# Patient Record
Sex: FEMALE | Race: WHITE | URBAN - METROPOLITAN AREA
[De-identification: names, ages, dates, MRNs, and addresses within clinical notes are randomized per-mention and may not be internally consistent; named-entity substitution may affect disease eponyms.]

---

## 2019-10-09 ENCOUNTER — OFFICE VISIT (OUTPATIENT)
Dept: FAMILY MEDICINE CLINIC | Age: 29
End: 2019-10-09

## 2019-10-09 VITALS
BODY MASS INDEX: 19.6 KG/M2 | DIASTOLIC BLOOD PRESSURE: 66 MMHG | RESPIRATION RATE: 14 BRPM | OXYGEN SATURATION: 97 % | SYSTOLIC BLOOD PRESSURE: 110 MMHG | HEART RATE: 98 BPM | HEIGHT: 63 IN | WEIGHT: 110.6 LBS | TEMPERATURE: 97.8 F

## 2019-10-09 DIAGNOSIS — Z86.59 HISTORY OF PANIC ATTACKS: ICD-10-CM

## 2019-10-09 DIAGNOSIS — F42.2 MIXED OBSESSIONAL THOUGHTS AND ACTS: ICD-10-CM

## 2019-10-09 DIAGNOSIS — F41.9 ANXIETY: Primary | ICD-10-CM

## 2019-10-09 DIAGNOSIS — Z23 ENCOUNTER FOR IMMUNIZATION: ICD-10-CM

## 2019-10-09 RX ORDER — SERTRALINE HYDROCHLORIDE 100 MG/1
100 TABLET, FILM COATED ORAL DAILY
Qty: 90 TAB | Refills: 1 | Status: SHIPPED | OUTPATIENT
Start: 2019-10-09 | End: 2020-02-11 | Stop reason: SDUPTHER

## 2019-10-09 RX ORDER — SERTRALINE HYDROCHLORIDE 100 MG/1
TABLET, FILM COATED ORAL DAILY
COMMUNITY
End: 2019-10-09 | Stop reason: SDUPTHER

## 2019-10-09 NOTE — PROGRESS NOTES
Harmony Bray is a 30 y.o. female (: 1990) presenting to address:    Chief Complaint   Patient presents with    Medication Evaluation     request flu vaccine       Vitals:    10/09/19 1511   BP: 110/66   Pulse: 98   Resp: 14   Temp: 97.8 °F (36.6 °C)   TempSrc: Oral   SpO2: 97%   Weight: 110 lb 9.6 oz (50.2 kg)   Height: 5' 2.6\" (1.59 m)   PainSc:   0 - No pain       Hearing/Vision:   No exam data present    Learning Assessment:     Learning Assessment 10/9/2019   PRIMARY LEARNER Patient   HIGHEST LEVEL OF EDUCATION - PRIMARY LEARNER  4 YEARS OF COLLEGE   BARRIERS PRIMARY LEARNER NONE   CO-LEARNER CAREGIVER No   PRIMARY LANGUAGE ENGLISH    NEED No   LEARNER PREFERENCE PRIMARY LISTENING   ANSWERED BY patient   RELATIONSHIP SELF     Depression Screening:     3 most recent PHQ Screens 10/9/2019   Little interest or pleasure in doing things Not at all   Feeling down, depressed, irritable, or hopeless Not at all   Total Score PHQ 2 0     Fall Risk Assessment:     Fall Risk Assessment, last 12 mths 10/9/2019   Able to walk? Yes   Fall in past 12 months? No     Abuse Screening:     Abuse Screening Questionnaire 10/9/2019   Do you ever feel afraid of your partner? N   Are you in a relationship with someone who physically or mentally threatens you? N   Is it safe for you to go home? Y     Coordination of Care Questionaire:   1. Have you been to the ER, urgent care clinic since your last visit? Hospitalized since your last visit? YES, for childbirth    2. Have you seen or consulted any other health care providers outside of the [address] [place] [address] since your last visit? Include any pap smears or colon screening. YES, OB/GYN    Advanced Directive:   1. Do you have an Advanced Directive? NO    2. Would you like information on Advanced Directives?  NO

## 2019-10-09 NOTE — PROGRESS NOTES
Flu Immunization/s administered 10/9/2019 by Omid Pierre with consent. Patient tolerated procedure well. No reactions noted.

## 2019-10-09 NOTE — PATIENT INSTRUCTIONS
Obsessive-Compulsive Disorder: Care Instructions  Your Care Instructions    Obsessive-compulsive disorder (OCD) makes you have unwanted thoughts that occur over and over. For example, you might always wonder if the oven was turned off before you left home. To get rid of these thoughts, you may also develop a compulsion. This is an action or ritual you perform again and again. You might check several times to make sure the oven is off. The action only makes you feel better for a short time. If you try to resist the urge to do it, you may feel great anxiety or have panic attacks. Counseling (also called therapy) can help you control your thoughts and actions. You may have one-on-one therapy or group therapy, or both. In group therapy, people with the same concerns share their feelings and give each other support. You also may have family therapy. Your loved ones can learn more about how to help you. Your doctor also may prescribe medicine, such as an antidepressant, to help with symptoms. Follow-up care is a key part of your treatment and safety. Be sure to make and go to all appointments, and call your doctor if you are having problems. It's also a good idea to know your test results and keep a list of the medicines you take. How can you care for yourself at home? · Take your medicines exactly as prescribed. Call your doctor if you think you are having a problem with your medicine. You will get more details on the specific medicines your doctor prescribes. · Go to your counseling sessions and follow-up appointments. · Do any homework or exercises that your therapist gives you to do at home. · Involve family members and loved ones in your treatment, especially if your doctor suggests you go to therapy together. · Try to reduce stress. This can help you cope. Some ways to do this include:  ? Taking slow, deep breaths. ? Soaking in a warm bath. ? Listening to soothing music. ?  Taking a walk or doing some other exercise. ? Taking a yoga class. ? Having a massage or back rub. ? Drinking a warm, nonalcoholic, noncaffeinated beverage. · Eating a healthy, balanced diet and avoiding certain foods or drinks may also help you reduce stress. ? Avoid or limit caffeine. Coffee, tea, some soda pops, and chocolate contain caffeine. If you drink a lot of caffeine, reduce the amount gradually. Suddenly stopping use of caffeine can cause headaches and make it hard for you to concentrate. ? Limit alcohol to 2 drinks a day for men and 1 drink a day for women. Too much alcohol can cause health problems. ? Make mealtimes calm and relaxed. Try not to skip meals or eat on the run. Skipping meals can make other stress-related symptoms worse, such as headaches or stomach tension. ? Avoid eating to relieve stress. Some people turn to food to comfort themselves when they are under stress. This can lead to overeating and guilt. If this is a problem for you, try to replace eating with other actions that relieve stress, like taking a walk, playing with a pet, or taking a bath. When should you call for help? Call 911 anytime you think you may need emergency care. For example, call if:    · You feel you cannot stop from hurting yourself or someone else.   [facility] your doctor now or seek immediate medical care if:    · A person with OCD mentions suicide. If a suicide threat seems real, with a specific plan and a way to carry it out, you should stay with the person, or ask someone you trust to stay with the person, until you get help.    Watch closely for changes in your health, and be sure to contact your doctor if:    · Your unwanted thoughts or repeated actions and rituals upset your daily activities.     · Your symptoms of OCD are new or different from those you had before. Where can you learn more? Go to http://estella-mirza.info/.   Enter M712 in the search box to learn more about \"Obsessive-Compulsive Disorder: Care Instructions. \"  Current as of: May 28, 2019  Content Version: 12.2  © 4309-5953 [employer]. Care instructions adapted under license by [employer] (which disclaims liability or warranty for this information). If you have questions about a medical condition or this instruction, always ask your healthcare professional. [place] [address] any warranty or liability for your use of this information.

## 2019-10-09 NOTE — PROGRESS NOTES
Jason Wen is a 30 y.o.  female and presents with    Chief Complaint   Patient presents with    Medication Evaluation     request flu vaccine         Subjective:  Patient presents to establish care. Patient new to the practice with concern for needing med refills for chronic diagnosis of anxiety, panic attacks, OCD. Patient states she was originally diagnosed at age 4 and has continued medication management throughout her life. Patient is stable currently no SEs with medication use would like to continue same medication. Current Outpatient Medications   Medication Sig Dispense Refill    sertraline (ZOLOFT) 100 mg tablet Take 1 Tab by mouth daily. 90 Tab 1     No Known Allergies  Past Medical History:   Diagnosis Date    Contact dermatitis and eczema due to cause     ezcema     No past surgical history on file. History reviewed. No pertinent family history. Social History     Tobacco Use    Smoking status: Never Smoker    Smokeless tobacco: Never Used   Substance Use Topics    Alcohol use: Yes     Alcohol/week: 2.0 standard drinks     Types: 1 Glasses of wine, 1 Cans of beer per week     Frequency: 2-4 times a month     Drinks per session: 1 or 2     Binge frequency: Never       Review of Systems   Constitutional: Negative for chills and fever. HENT: Negative. Eyes: Negative for blurred vision. Respiratory: Negative for shortness of breath. Cardiovascular: Negative for chest pain and leg swelling. Gastrointestinal: Negative for abdominal pain, constipation, diarrhea, nausea and vomiting. Genitourinary: Negative. Musculoskeletal: Negative. Skin: Negative. Neurological: Negative for headaches. Psychiatric/Behavioral: The patient is nervous/anxious.          + dx ocd         Objective:  Vitals:    10/09/19 1511   BP: 110/66   Pulse: 98   Resp: 14   Temp: 97.8 °F (36.6 °C)   TempSrc: Oral   SpO2: 97%   Weight: 110 lb 9.6 oz (50.2 kg)   Height: 5' 2.6\" (1.59 m)   PainSc:   0 - No pain     General:   Well-groomed, well-nourished, in no distress, pleasant, alert, appropriate    Neck:      Neck supple, no swelling, mass or tenderness or thyromegaly  Cardiovasc:   RRR,  no murmur, rubs or gallops. Pulmonary:    Lungs clear bilaterally, no wheezing, rales or rhonchi. Assessment/Plan:      1. Encounter for immunization  - INFLUENZA VIRUS VAC QUAD,SPLIT,PRESV FREE SYRINGE IM    2. Anxiety  3. Mixed obsessional thoughts and acts  4. History of panic attacks  Zoloft refilled same dose stable condition no change continue for now return to care in 6 months and as needed. Discussed patient will be due for CPE and labs in February can align appointments to complete at six-month appointment. I have discussed the diagnosis with the patient and the intended plan as seen in the above orders. The patient has received an after-visit summary and questions were answered concerning future plans. I have discussed medication side effects and warnings with the patient as well. I have reviewed the plan of care with the patient, accepted their input and they are in agreement with the treatment goals. Follow-up and Dispositions    · Return in about 6 months (around 4/9/2020). More than 1/2 of this 30 minute visit was spent face to face in counselling and coordination of care, as described above. This document may have been created with the aid of dictation software. Text may contain errors, particularly phonetic errors.      Kathy Valdez [facility]

## 2020-01-29 ENCOUNTER — TELEPHONE (OUTPATIENT)
Dept: FAMILY MEDICINE CLINIC | Age: 30
End: 2020-01-29

## 2020-01-29 DIAGNOSIS — Z00.00 ROUTINE ADULT HEALTH MAINTENANCE: Primary | ICD-10-CM

## 2020-01-29 DIAGNOSIS — F41.9 ANXIETY: ICD-10-CM

## 2020-01-29 NOTE — TELEPHONE ENCOUNTER
Please call Ashia Lucas and obtain further information re: her pregnancy status. When was her LMP (or does she have a due date)? Has she established care with OB/GYN? Will put in orders appropriately once that info is reviewed.

## 2020-01-29 NOTE — TELEPHONE ENCOUNTER
Patient came in office for scheduled CPE with NP Skye Jung, patient was unaware that NP was no longer in office and has scheduled the next available appt w/ Dr. Laura Mccormick for CPE and patient was informed to call week before to make sure labs were placed. Patient informed me that she is pregnant so that if there were any additional labs that needed to be added that could be done. Please advise.   Future Appointments   Date Time Provider Addison Connor   2/11/2020  8:30 AM Mary Leblanc MD [facility]

## 2020-01-30 DIAGNOSIS — F41.9 ANXIETY: ICD-10-CM

## 2020-01-30 DIAGNOSIS — Z00.00 ROUTINE ADULT HEALTH MAINTENANCE: ICD-10-CM

## 2020-01-30 NOTE — TELEPHONE ENCOUNTER
Labs have been ordered. No need to be fasting, as checking cholesterol while pregnant would make them falsely elevated.     Orders Placed This Encounter    HEMOGLOBIN A1C WITH EAG     Standing Status:   Future     Standing Expiration Date:   1/30/2021    CBC WITH AUTOMATED DIFF     Standing Status:   Future     Standing Expiration Date:   2/80/5284    METABOLIC PANEL, COMPREHENSIVE     Standing Status:   Future     Standing Expiration Date:   1/30/2021    T4, FREE     Standing Status:   Future     Standing Expiration Date:   1/30/2021    TSH 3RD GENERATION     Standing Status:   Future     Standing Expiration Date:   1/30/2021    VITAMIN D, 25 HYDROXY     Standing Status:   Future     Standing Expiration Date:   1/30/2021     Future Appointments   Date Time Provider Addison Miristi   2/11/2020  8:30 AM Ronel Valdes MD [facility]

## 2020-01-30 NOTE — TELEPHONE ENCOUNTER
Spoke to patient she states the first day of her last cycle was 12/22/20. She does have an OBGYN Dr. Lorri Gonzalez. Informed her to come have labs done at least a week prior to appointment and to fast incase fasting labs are needed. Will update gyn in care team as well.

## 2020-02-11 ENCOUNTER — OFFICE VISIT (OUTPATIENT)
Dept: FAMILY MEDICINE CLINIC | Age: 30
End: 2020-02-11

## 2020-02-11 VITALS
OXYGEN SATURATION: 99 % | TEMPERATURE: 98.3 F | BODY MASS INDEX: 20.06 KG/M2 | HEART RATE: 87 BPM | HEIGHT: 63 IN | SYSTOLIC BLOOD PRESSURE: 101 MMHG | WEIGHT: 113.2 LBS | RESPIRATION RATE: 16 BRPM | DIASTOLIC BLOOD PRESSURE: 55 MMHG

## 2020-02-11 DIAGNOSIS — F41.9 ANXIETY: ICD-10-CM

## 2020-02-11 DIAGNOSIS — Z00.00 ROUTINE ADULT HEALTH MAINTENANCE: Primary | ICD-10-CM

## 2020-02-11 DIAGNOSIS — N64.4 BREAST PAIN, LEFT: ICD-10-CM

## 2020-02-11 DIAGNOSIS — Z3A.01 LESS THAN 8 WEEKS GESTATION OF PREGNANCY: ICD-10-CM

## 2020-02-11 LAB
25(OH)D3+25(OH)D2 SERPL-MCNC: 20.2 NG/ML (ref 30–100)
ALBUMIN SERPL-MCNC: 4.6 G/DL (ref 3.8–4.8)
ALBUMIN/GLOB SERPL: 1.9 {RATIO} (ref 1.2–2.2)
ALP SERPL-CCNC: 67 IU/L (ref 39–117)
ALT SERPL-CCNC: 12 IU/L (ref 0–32)
AST SERPL-CCNC: 13 IU/L (ref 0–40)
BASOPHILS # BLD AUTO: 0 X10E3/UL (ref 0–0.2)
BASOPHILS NFR BLD AUTO: 0 %
BILIRUB SERPL-MCNC: 0.2 MG/DL (ref 0–1.2)
BUN SERPL-MCNC: 7 MG/DL (ref 6–20)
BUN/CREAT SERPL: 11 (ref 9–23)
CALCIUM SERPL-MCNC: 9.5 MG/DL (ref 8.7–10.2)
CHLORIDE SERPL-SCNC: 100 MMOL/L (ref 96–106)
CO2 SERPL-SCNC: 23 MMOL/L (ref 20–29)
CREAT SERPL-MCNC: 0.65 MG/DL (ref 0.57–1)
EOSINOPHIL # BLD AUTO: 0 X10E3/UL (ref 0–0.4)
EOSINOPHIL NFR BLD AUTO: 0 %
ERYTHROCYTE [DISTWIDTH] IN BLOOD BY AUTOMATED COUNT: 11.7 % (ref 11.7–15.4)
EST. AVERAGE GLUCOSE BLD GHB EST-MCNC: 100 MG/DL
GLOBULIN SER CALC-MCNC: 2.4 G/DL (ref 1.5–4.5)
GLUCOSE SERPL-MCNC: 86 MG/DL (ref 65–99)
HBA1C MFR BLD: 5.1 % (ref 4.8–5.6)
HCT VFR BLD AUTO: 37.7 % (ref 34–46.6)
HGB BLD-MCNC: 12.7 G/DL (ref 11.1–15.9)
IMM GRANULOCYTES # BLD AUTO: 0 X10E3/UL (ref 0–0.1)
IMM GRANULOCYTES NFR BLD AUTO: 0 %
LYMPHOCYTES # BLD AUTO: 2.5 X10E3/UL (ref 0.7–3.1)
LYMPHOCYTES NFR BLD AUTO: 29 %
MCH RBC QN AUTO: 31 PG (ref 26.6–33)
MCHC RBC AUTO-ENTMCNC: 33.7 G/DL (ref 31.5–35.7)
MCV RBC AUTO: 92 FL (ref 79–97)
MONOCYTES # BLD AUTO: 0.7 X10E3/UL (ref 0.1–0.9)
MONOCYTES NFR BLD AUTO: 8 %
NEUTROPHILS # BLD AUTO: 5.3 X10E3/UL (ref 1.4–7)
NEUTROPHILS NFR BLD AUTO: 63 %
PLATELET # BLD AUTO: 380 X10E3/UL (ref 150–450)
POTASSIUM SERPL-SCNC: 4.3 MMOL/L (ref 3.5–5.2)
PROT SERPL-MCNC: 7 G/DL (ref 6–8.5)
RBC # BLD AUTO: 4.1 X10E6/UL (ref 3.77–5.28)
SODIUM SERPL-SCNC: 138 MMOL/L (ref 134–144)
T4 FREE SERPL-MCNC: 1.06 NG/DL (ref 0.82–1.77)
TSH SERPL DL<=0.005 MIU/L-ACNC: 1.25 UIU/ML (ref 0.45–4.5)
WBC # BLD AUTO: 8.5 X10E3/UL (ref 3.4–10.8)

## 2020-02-11 RX ORDER — SERTRALINE HYDROCHLORIDE 100 MG/1
100 TABLET, FILM COATED ORAL DAILY
Qty: 90 TAB | Refills: 3 | Status: SHIPPED | OUTPATIENT
Start: 2020-02-11 | End: 2021-02-25

## 2020-02-11 NOTE — PROGRESS NOTES
Yanet Luke is a 30 y.o. female (: 1990) presenting to address:    Chief Complaint   Patient presents with    Complete Physical       Vitals:    20 0846   BP: 101/55   Pulse: 87   Resp: 16   Temp: 98.3 °F (36.8 °C)   TempSrc: Oral   SpO2: 99%   Weight: 113 lb 3.2 oz (51.3 kg)   Height: 5' 2.6\" (1.59 m)   PainSc:   0 - No pain   LMP: 2019       Hearing/Vision:   No exam data present    Learning Assessment:     Learning Assessment 10/9/2019   PRIMARY LEARNER Patient   HIGHEST LEVEL OF EDUCATION - PRIMARY LEARNER  4 YEARS OF COLLEGE   BARRIERS PRIMARY LEARNER NONE   CO-LEARNER CAREGIVER No   PRIMARY LANGUAGE ENGLISH    NEED No   LEARNER PREFERENCE PRIMARY LISTENING   ANSWERED BY patient   RELATIONSHIP SELF     Depression Screening:     3 most recent PHQ Screens 2020   Little interest or pleasure in doing things Not at all   Feeling down, depressed, irritable, or hopeless Not at all   Total Score PHQ 2 0   Trouble falling or staying asleep, or sleeping too much Not at all   Feeling tired or having little energy More than half the days   Poor appetite, weight loss, or overeating Not at all   Feeling bad about yourself - or that you are a failure or have let yourself or your family down Not at all   Trouble concentrating on things such as school, work, reading, or watching TV Not at all   Moving or speaking so slowly that other people could have noticed; or the opposite being so fidgety that others notice Not at all   Thoughts of being better off dead, or hurting yourself in some way Not at all   PHQ 9 Score 2   How difficult have these problems made it for you to do your work, take care of your home and get along with others Somewhat difficult     Fall Risk Assessment:     Fall Risk Assessment, last 12 mths 10/9/2019   Able to walk? Yes   Fall in past 12 months? No     Abuse Screening:     Abuse Screening Questionnaire 10/9/2019   Do you ever feel afraid of your partner?  N   Are you in a relationship with someone who physically or mentally threatens you? N   Is it safe for you to go home? Y     Coordination of Care Questionaire:   1. Have you been to the ER, urgent care clinic since your last visit? Hospitalized since your last visit? NO    2. Have you seen or consulted any other health care providers outside of the [address] [place] [address] since your last visit? Include any pap smears or colon screening. NO    Advanced Directive:   1. Do you have an Advanced Directive? NO    2. Would you like information on Advanced Directives?  NO

## 2020-02-11 NOTE — PROGRESS NOTES
[address] [facility]  Primary Care Office Visit - Complete Physical    Andres Cisse is a 30 y.o. female presenting for annual physical.  : 1990        Assessment/Plan:     1. Routine adult health maintenance  Establishing care with OB next week. See Dx #4. Labs pending (drawn yesterday). 2. Anxiety  Ongoing, well controlled. No change to current regimen. Is aware that she can continue this medication while pregnant and also while breastfeeding.  - sertraline (ZOLOFT) 100 mg tablet; Take 1 Tab by mouth daily. Dispense: 90 Tab; Refill: 3    3. Breast pain, left  Initial encounter. Relatively reassuring exam.  Advised her to inform her OB. 4. Less than 8 weeks gestation of pregnancy  Establishing care with OB next week. Is taking PNV. Orders & Diagnoses associated with This Encounter:         ICD-10-CM ICD-9-CM   1. Routine adult health maintenance Z00.00 V70.0   2. Anxiety F41.9 300.00   3. Breast pain, left N64.4 611.71   4. Less than 8 weeks gestation of pregnancy Z3A.01 V22.2       Orders Placed This Encounter    sertraline (ZOLOFT) 100 mg tablet     Sig: Take 1 Tab by mouth daily. Dispense:  90 Tab     Refill:  3       Management plan & patient instructions reviewed with Andres Tanna, who voiced understanding. This document may have been created with the aid of dictation software. Text may contain errors, particularly phonetic errors. Jeannie Reinoso MD  Internal Medicine, Family Medicine & Sports Medicine  2020         Subjective   History:   Andres Cisse is a 30 y.o. female presenting for an annual physical.    Had labs done yesterday. 11mo daughter born only 2 days after arriving to the area. LMP 2019; thus 7+1 GA today  1st appt with OB is Monday  + nausea, and more than usual fatigue    Also notes some dull left breast pain. Denies any nipple discharge, skin color changes. Stopped breastfeeding when daughter was ~5mo.   Denies any specific trauma however \"I do carry my daughter all the time on that side\". Has dx of OCD  Been on pharmacotherapy for a long time, specifically on zoloft since age 11  Did try to wean down / off during last pregnancy \"and that  Really didn't go well\"       AMIRA 2/7 2/11/2020   Feeling nervous, anxious or on edge? 0   Not being able to stop or control worrying? 0   AMIRA-2 Subtotal 0   Worrying too much about different things? 0   Trouble relaxing? 0   Being so restless that it is hard to sit still? 0   Becoming easily annoyed or irritable? 0   Feeling afraid as if something awful might happen? 0   AMIRA-7 Total Score 0   If you checked off any problems, how difficult have these problems made it for you to do your work, take care of thinks at home, or get along with other people? Not at all difficult       3 most recent PHQ Screens 2/11/2020   Little interest or pleasure in doing things Not at all   Feeling down, depressed, irritable, or hopeless Not at all   Total Score PHQ 2 0   Trouble falling or staying asleep, or sleeping too much Not at all   Feeling tired or having little energy More than half the days   Poor appetite, weight loss, or overeating Not at all   Feeling bad about yourself - or that you are a failure or have let yourself or your family down Not at all   Trouble concentrating on things such as school, work, reading, or watching TV Not at all   Moving or speaking so slowly that other people could have noticed; or the opposite being so fidgety that others notice Not at all   Thoughts of being better off dead, or hurting yourself in some way Not at all   PHQ 9 Score 2   How difficult have these problems made it for you to do your work, take care of your home and get along with others Somewhat difficult           Past Medical History:   Diagnosis Date    Contact dermatitis and eczema due to cause     ezcema     History reviewed. No pertinent surgical history. reports that she has never smoked.  She has never used smokeless tobacco. She reports previous alcohol use of about 2.0 standard drinks of alcohol per week. She reports that she does not use drugs. Social History     Patient does not qualify to have social determinant information on file (likely too young). Social History Narrative    2/11/2020:   to Sera Eldridge (in [place]), originally from [place]. Social History     Tobacco Use   Smoking Status Never Smoker   Smokeless Tobacco Never Used     Family History   Problem Relation Age of Onset    No Known Problems Mother     Other Father 46        ALS    No Known Problems Brother     No Known Problems Daughter      No Known Allergies    Problem List:      Patient Active Problem List    Diagnosis    Mixed obsessional thoughts and acts    History of panic attacks    Anxiety       Medications:     Current Outpatient Medications   Medication Sig    sertraline (ZOLOFT) 100 mg tablet Take 1 Tab by mouth daily. No current facility-administered medications for this visit. Review of Systems:     Review of Systems   Constitutional: Negative for chills and fever. HENT: Negative for ear pain and sore throat. Respiratory: Negative for cough and shortness of breath. Cardiovascular: Negative for chest pain and palpitations. Gastrointestinal: Negative for abdominal pain. Genitourinary: Negative for dysuria. Musculoskeletal: Negative for myalgias. Skin: Negative for rash. Neurological: Negative for speech change, focal weakness and headaches. Endo/Heme/Allergies: Does not bruise/bleed easily. Psychiatric/Behavioral: Negative for depression. The patient is not nervous/anxious and does not have insomnia.              Objective   Physical Assessment:   VS:    Visit Vitals  /55 (BP 1 Location: Right arm, BP Patient Position: Sitting)   Pulse 87   Temp 98.3 °F (36.8 °C) (Oral)   Resp 16   Ht 5' 2.6\" (1.59 m)   Wt 113 lb 3.2 oz (51.3 kg)   LMP 12/23/2019 (Exact Date)   SpO2 99% Breastfeeding Unknown   BMI 20.31 kg/m²       Physical Exam  Nursing note reviewed. Constitutional:       Appearance: She is well-developed. She is not diaphoretic. HENT:      Head: Normocephalic and atraumatic. Neck:      Musculoskeletal: Neck supple. Thyroid: No thyromegaly. Cardiovascular:      Rate and Rhythm: Normal rate and regular rhythm. Heart sounds: No murmur. Pulmonary:      Effort: Pulmonary effort is normal.      Breath sounds: Normal breath sounds. No wheezing or rales. Chest:       Musculoskeletal: Normal range of motion. Skin:     General: Skin is warm and dry. Neurological:      Mental Status: She is alert and oriented to person, place, and time. Cranial Nerves: No cranial nerve deficit. Coordination: Coordination normal.   Psychiatric:         Behavior: Behavior normal.         Thought Content:  Thought content normal.         Judgment: Judgment normal.

## 2020-02-11 NOTE — PATIENT INSTRUCTIONS
To Do: - finish signing up for [employer]  - let your OB know about your left breast discomfort, although the exam was benign to me  - ask them to send me a copy of their notes. - best of luck! TESTING RESULTS   Results will be released to [facility]. Normal results will be sent via mail. If you have questions about your results, please schedule a follow up appointment to discuss with your PCP. MEDICATION REFILLS    Please allow at least 2 business days for refill requests to be addressed. Medication refills may be requested through your pharmacy. Refills will not be provided by the after hours/on call provider.

## 2020-06-07 PROBLEM — E55.9 VITAMIN D DEFICIENCY: Status: ACTIVE | Noted: 2020-06-07

## 2021-02-15 ENCOUNTER — TELEPHONE (OUTPATIENT)
Dept: FAMILY MEDICINE CLINIC | Age: 31
End: 2021-02-15

## 2021-02-15 NOTE — TELEPHONE ENCOUNTER
Spoke to patient and informed that she just needs to call and get scheduled at a testing site and they will do the ordering.

## 2021-02-15 NOTE — TELEPHONE ENCOUNTER
Pt is currently experiencing symptoms and is wanting to be tested for covid however she says that since she has  they are requiring that we order it before she gets it done. She has a low grade fever of 99.4 and body aches. Please advise.

## 2021-09-22 DIAGNOSIS — F41.9 ANXIETY: ICD-10-CM

## 2021-09-23 RX ORDER — SERTRALINE HYDROCHLORIDE 100 MG/1
TABLET, FILM COATED ORAL
Qty: 90 TABLET | Refills: 1 | OUTPATIENT
Start: 2021-09-23

## 2022-03-19 PROBLEM — F42.2 MIXED OBSESSIONAL THOUGHTS AND ACTS: Status: ACTIVE | Noted: 2019-10-09

## 2022-03-19 PROBLEM — E55.9 VITAMIN D DEFICIENCY: Status: ACTIVE | Noted: 2020-06-07

## 2022-03-19 PROBLEM — F41.9 ANXIETY: Status: ACTIVE | Noted: 2019-03-18

## 2022-03-20 PROBLEM — Z86.59 HISTORY OF PANIC ATTACKS: Status: ACTIVE | Noted: 2019-10-09

## 2022-11-23 ENCOUNTER — TELEPHONE (OUTPATIENT)
Dept: FAMILY MEDICINE CLINIC | Age: 32
End: 2022-11-23

## 2022-11-23 NOTE — TELEPHONE ENCOUNTER
Lvm for pt to call to schedule transitional care/physical appt with provider of choice in the office.

## 2023-01-06 ENCOUNTER — NURSE TRIAGE (OUTPATIENT)
Dept: OTHER | Facility: CLINIC | Age: 33
End: 2023-01-06

## 2023-01-06 NOTE — TELEPHONE ENCOUNTER
Location of patient: [address] [place] from [facility] with [employer]; Patient with Red Flag Complaint requesting to establish care with [facility]. Subjective: Caller states \"tingling in hands, muscle pain in both arms and legs\"     Current Symptoms: see above, missed 2 cycles of period, pregnancy tests negative, tingling is intermittent, muscle pain in upper arms and thighs, fairly constant. Mild pain. Onset: 2 weeks ago; unchanged    Associated Symptoms: NA    Pain Severity: 3/10; dull and pressure and tightness; constant, mild    Temperature: denies fever     What has been tried: nothing    LMP:  end of october  Pregnant: No    Recommended disposition: See in Office Within 2 Kevengeorgina Byrne advised C if she cannot get new patient appt within 2 weeks, writer transferred to [facility] ([facility]) for new patient appt scheduling    Care advice provided, patient verbalizes understanding; denies any other questions or concerns; instructed to call back for any new or worsening symptoms. Patient/Caller agrees with recommended disposition; writer provided warm transfer to [facility] for appointment scheduling    Attention Provider: Thank you for allowing me to participate in the care of your patient. The patient was connected to triage in response to information provided to the [facility]. Please do not respond through this encounter as the response is not directed to a shared pool.     Reason for Disposition   MILD pain (e.g., does not interfere with normal activities) and present > 7 days    Protocols used: Muscle Aches and Body Pain-ADULT-[place]

## 2023-01-25 ENCOUNTER — OFFICE VISIT (OUTPATIENT)
Dept: FAMILY MEDICINE CLINIC | Age: 33
End: 2023-01-25
Payer: OTHER GOVERNMENT

## 2023-01-25 VITALS
BODY MASS INDEX: 19.95 KG/M2 | HEART RATE: 88 BPM | WEIGHT: 112.6 LBS | HEIGHT: 63 IN | DIASTOLIC BLOOD PRESSURE: 72 MMHG | RESPIRATION RATE: 18 BRPM | SYSTOLIC BLOOD PRESSURE: 112 MMHG | TEMPERATURE: 97.7 F | OXYGEN SATURATION: 97 %

## 2023-01-25 DIAGNOSIS — Z00.00 ROUTINE GENERAL MEDICAL EXAMINATION AT A HEALTH CARE FACILITY: Primary | ICD-10-CM

## 2023-01-25 DIAGNOSIS — F42.2 MIXED OBSESSIONAL THOUGHTS AND ACTS: ICD-10-CM

## 2023-01-25 DIAGNOSIS — E55.9 VITAMIN D DEFICIENCY: ICD-10-CM

## 2023-01-25 PROBLEM — F41.9 ANXIETY: Status: RESOLVED | Noted: 2019-03-18 | Resolved: 2023-01-25

## 2023-01-25 PROBLEM — Z86.59 HISTORY OF PANIC ATTACKS: Status: RESOLVED | Noted: 2019-10-09 | Resolved: 2023-01-25

## 2023-01-25 PROCEDURE — 99395 PREV VISIT EST AGE 18-39: CPT | Performed by: INTERNAL MEDICINE

## 2023-01-25 RX ORDER — NORETHINDRONE ACETATE AND ETHINYL ESTRADIOL AND FERROUS FUMARATE 1MG-20(21)
1 KIT ORAL DAILY
COMMUNITY
Start: 2022-11-13

## 2023-01-25 RX ORDER — SERTRALINE HYDROCHLORIDE 100 MG/1
100 TABLET, FILM COATED ORAL DAILY
Qty: 90 TABLET | Refills: 1 | Status: SHIPPED | OUTPATIENT
Start: 2023-01-25

## 2023-01-25 RX ORDER — MULTIVITAMIN
1 TABLET ORAL DAILY
COMMUNITY

## 2023-01-25 NOTE — PROGRESS NOTES
HISTORY OF PRESENT ILLNESS  Soo Mcintyre is a 27 y.o. female. HPI  In for CPE  OCD, stable, on zoloft daily, need refill  Vit d def, stable,she is taking MVI tablet daily, will check labs  Had pap/gynecology exam 2 weeks ago, stated that she also had CBC and TSH then, will get copy from Gynecology  Review of Systems   Constitutional:  Negative for fever. HENT:  Negative for sore throat. Respiratory:  Negative for cough and shortness of breath. Cardiovascular:  Negative for chest pain and leg swelling. Gastrointestinal:  Negative for abdominal pain and nausea. Neurological:  Negative for headaches. Psychiatric/Behavioral:  Negative for depression. The patient does not have insomnia. Physical Exam  Vitals reviewed. Constitutional:       Appearance: She is well-developed. HENT:      Head: Normocephalic and atraumatic. Right Ear: Tympanic membrane, ear canal and external ear normal.      Left Ear: Tympanic membrane, ear canal and external ear normal.      Mouth/Throat:      Pharynx: No oropharyngeal exudate or posterior oropharyngeal erythema. Eyes:      Extraocular Movements: Extraocular movements intact. Conjunctiva/sclera: Conjunctivae normal.      Pupils: Pupils are equal, round, and reactive to light. Neck:      Thyroid: No thyromegaly. Vascular: No JVD. Cardiovascular:      Rate and Rhythm: Normal rate and regular rhythm. Heart sounds: Normal heart sounds. No murmur heard. Pulmonary:      Effort: Pulmonary effort is normal.      Breath sounds: Normal breath sounds. No wheezing or rales. Abdominal:      General: Bowel sounds are normal.      Palpations: Abdomen is soft. There is no hepatomegaly, splenomegaly or mass. Tenderness: There is no abdominal tenderness. Musculoskeletal:         General: Normal range of motion. Cervical back: Normal range of motion and neck supple. Right lower leg: No edema. Left lower leg: No edema. Lymphadenopathy:      Cervical: No cervical adenopathy. Skin:     General: Skin is warm and dry. Findings: No rash. Neurological:      Mental Status: She is alert and oriented to person, place, and time. Psychiatric:         Attention and Perception: Attention normal.         Mood and Affect: Mood and affect normal.         Speech: Speech normal.         Behavior: Behavior normal.       ASSESSMENT and PLAN  Diagnoses and all orders for this visit:    1. Routine general medical examination at a health care facility  -     LIPID PANEL; Future  -     METABOLIC PANEL, COMPREHENSIVE; Future    2. Mixed obsessional thoughts and acts, controlled  -     sertraline (ZOLOFT) 100 mg tablet; Take 1 Tablet by mouth daily. 3. Vitamin D deficiency, stable  -     VITAMIN D, 25 HYDROXY; Future      Follow-up and Dispositions    Return in about 6 months (around 7/25/2023).

## 2023-01-25 NOTE — PROGRESS NOTES
Tammie Hatch is a 27 y.o. female (: 1990) presenting to address:    Chief Complaint   Patient presents with    Transitions Of Care    Physical       Vitals:    23 1400   BP: 112/72   Pulse: 88   Resp: 18   Temp: 97.7 °F (36.5 °C)   TempSrc: Temporal   SpO2: 97%   Weight: 112 lb 9.6 oz (51.1 kg)   Height: 5' 2.6\" (1.59 m)   PainSc:   0 - No pain       Hearing/Vision:   No results found. Learning Assessment:     Learning Assessment 10/9/2019   PRIMARY LEARNER Patient   HIGHEST LEVEL OF EDUCATION - PRIMARY LEARNER  4 YEARS OF COLLEGE   BARRIERS PRIMARY LEARNER NONE   CO-LEARNER CAREGIVER No   PRIMARY LANGUAGE ENGLISH    NEED No   LEARNER PREFERENCE PRIMARY LISTENING   ANSWERED BY patient   RELATIONSHIP SELF     Depression Screening:     3 most recent PHQ Screens 2023   Little interest or pleasure in doing things Not at all   Feeling down, depressed, irritable, or hopeless Not at all   Total Score PHQ 2 0   Trouble falling or staying asleep, or sleeping too much -   Feeling tired or having little energy -   Poor appetite, weight loss, or overeating -   Feeling bad about yourself - or that you are a failure or have let yourself or your family down -   Trouble concentrating on things such as school, work, reading, or watching TV -   Moving or speaking so slowly that other people could have noticed; or the opposite being so fidgety that others notice -   Thoughts of being better off dead, or hurting yourself in some way -   PHQ 9 Score -   How difficult have these problems made it for you to do your work, take care of your home and get along with others -     Fall Risk Assessment:     Fall Risk Assessment, last 12 mths 2023   Able to walk? Yes   Fall in past 12 months? 0   Do you feel unsteady? 0   Are you worried about falling 0     Abuse Screening:     Abuse Screening Questionnaire 2023   Do you ever feel afraid of your partner?  N   Are you in a relationship with someone who physically or mentally threatens you? N   Is it safe for you to go home? Y     ADL Assessment:   No flowsheet data found. Coordination of Care Questionaire:   1. \"Have you been to the ER, urgent care clinic since your last visit? Hospitalized since your last visit? \" No    2. \"Have you seen or consulted any other health care providers outside of the [address] [place] [address] since your last visit? \" No     3. For patients aged 39-70: Has the patient had a colonoscopy? NA - based on age     If the patient is female:    4. For patients aged 41-77: Has the patient had a mammogram within the past 2 years? NA - based on age    11. For patients aged 21-65: Has the patient had a pap smear? Yes - Care Gap present. Rooming MA/LPN to request most recent results    Advanced Directive:   1. Do you have an Advanced Directive? NO    2. Would you like information on Advanced Directives?  NO

## 2023-02-01 ENCOUNTER — HOSPITAL ENCOUNTER (OUTPATIENT)
Dept: LAB | Age: 33
Discharge: HOME | End: 2023-02-01
Payer: OTHER GOVERNMENT

## 2023-02-01 ENCOUNTER — APPOINTMENT (OUTPATIENT)
Dept: FAMILY MEDICINE CLINIC | Age: 33
End: 2023-02-01

## 2023-02-01 DIAGNOSIS — Z00.00 ROUTINE GENERAL MEDICAL EXAMINATION AT A HEALTH CARE FACILITY: ICD-10-CM

## 2023-02-01 DIAGNOSIS — E55.9 VITAMIN D DEFICIENCY: ICD-10-CM

## 2023-02-01 LAB
25(OH)D3 SERPL-MCNC: 23.5 NG/ML (ref 30–100)
ALBUMIN SERPL-MCNC: 3.6 G/DL (ref 3.4–5)
ALBUMIN/GLOB SERPL: 0.9 (ref 0.8–1.7)
ALP SERPL-CCNC: 50 U/L (ref 45–117)
ALT SERPL-CCNC: 15 U/L (ref 13–56)
ANION GAP SERPL CALC-SCNC: 3 MMOL/L (ref 3–18)
AST SERPL-CCNC: 9 U/L (ref 10–38)
BILIRUB SERPL-MCNC: 0.3 MG/DL (ref 0.2–1)
BUN SERPL-MCNC: 11 MG/DL (ref 7–18)
BUN/CREAT SERPL: 18 (ref 12–20)
CALCIUM SERPL-MCNC: 8.8 MG/DL (ref 8.5–10.1)
CHLORIDE SERPL-SCNC: 106 MMOL/L (ref 100–111)
CHOLEST SERPL-MCNC: 215 MG/DL
CO2 SERPL-SCNC: 28 MMOL/L (ref 21–32)
CREAT SERPL-MCNC: 0.62 MG/DL (ref 0.6–1.3)
GLOBULIN SER CALC-MCNC: 3.8 G/DL (ref 2–4)
GLUCOSE SERPL-MCNC: 91 MG/DL (ref 74–99)
HDLC SERPL-MCNC: 51 MG/DL (ref 40–60)
HDLC SERPL: 4.2 (ref 0–5)
LDLC SERPL CALC-MCNC: 135 MG/DL (ref 0–100)
LIPID PROFILE,FLP: ABNORMAL
POTASSIUM SERPL-SCNC: 4.2 MMOL/L (ref 3.5–5.5)
PROT SERPL-MCNC: 7.4 G/DL (ref 6.4–8.2)
SODIUM SERPL-SCNC: 137 MMOL/L (ref 136–145)
TRIGL SERPL-MCNC: 145 MG/DL (ref ?–150)
VLDLC SERPL CALC-MCNC: 29 MG/DL

## 2023-02-01 PROCEDURE — 82306 VITAMIN D 25 HYDROXY: CPT

## 2023-02-01 PROCEDURE — 36415 COLL VENOUS BLD VENIPUNCTURE: CPT

## 2023-02-01 PROCEDURE — 80053 COMPREHEN METABOLIC PANEL: CPT

## 2023-02-01 PROCEDURE — 80061 LIPID PANEL: CPT

## 2023-02-03 NOTE — PROGRESS NOTES
Glucose is normal  Vit D is low, need to add Vit D3 OTC, 2000 units daily  Cholesterol is slightly elevated, need low cholesterol diet.

## 2023-08-16 DIAGNOSIS — F42.2 MIXED OBSESSIONAL THOUGHTS AND ACTS: ICD-10-CM

## 2023-08-16 RX ORDER — SERTRALINE HYDROCHLORIDE 100 MG/1
TABLET, FILM COATED ORAL
Qty: 30 TABLET | Refills: 0 | Status: SHIPPED | OUTPATIENT
Start: 2023-08-16 | End: 2023-08-18 | Stop reason: SDUPTHER

## 2023-08-18 ENCOUNTER — OFFICE VISIT (OUTPATIENT)
Dept: FAMILY MEDICINE CLINIC | Facility: CLINIC | Age: 33
End: 2023-08-18
Payer: OTHER GOVERNMENT

## 2023-08-18 VITALS
SYSTOLIC BLOOD PRESSURE: 119 MMHG | BODY MASS INDEX: 21.2 KG/M2 | WEIGHT: 115.2 LBS | HEART RATE: 84 BPM | RESPIRATION RATE: 18 BRPM | HEIGHT: 62 IN | DIASTOLIC BLOOD PRESSURE: 71 MMHG | OXYGEN SATURATION: 98 % | TEMPERATURE: 97.6 F

## 2023-08-18 DIAGNOSIS — F42.2 MIXED OBSESSIONAL THOUGHTS AND ACTS: ICD-10-CM

## 2023-08-18 PROCEDURE — 99213 OFFICE O/P EST LOW 20 MIN: CPT | Performed by: INTERNAL MEDICINE

## 2023-08-18 RX ORDER — NORETHINDRONE ACETATE AND ETHINYL ESTRADIOL AND FERROUS FUMARATE 1MG-20(21)
1 KIT ORAL DAILY
COMMUNITY
Start: 2023-07-19

## 2023-08-18 RX ORDER — SERTRALINE HYDROCHLORIDE 100 MG/1
100 TABLET, FILM COATED ORAL DAILY
Qty: 90 TABLET | Refills: 1 | Status: SHIPPED | OUTPATIENT
Start: 2023-08-18

## 2023-08-18 NOTE — PROGRESS NOTES
HISTORY OF PRESENT ILLNESS  Yuliya Arroyo is a 26 y.o. female    HPI  OCD, stable, controlled on zoloft daily    Review of Systems   Cardiovascular:  Negative for chest pain. Psychiatric/Behavioral:  Negative for behavioral problems and dysphoric mood. The patient is not nervous/anxious. Physical Exam  Vitals reviewed. Neurological:      Mental Status: She is oriented to person, place, and time. Psychiatric:         Attention and Perception: Attention normal.         Mood and Affect: Mood and affect normal. Mood is not anxious or depressed. Speech: Speech normal.         Behavior: Behavior normal.         Thought Content: Thought content normal.        ASSESSMENT and PLAN    1. Mixed obsessional thoughts and acts, controlled, continue:  -     sertraline (ZOLOFT) 100 MG tablet; Take 1 tablet by mouth daily, Disp-90 tablet, R-1Normal         Return in about 23 weeks (around 1/26/2024) for Physical next visit.

## 2023-10-23 ENCOUNTER — OFFICE VISIT (OUTPATIENT)
Dept: FAMILY MEDICINE CLINIC | Facility: CLINIC | Age: 33
End: 2023-10-23
Payer: OTHER GOVERNMENT

## 2023-10-23 VITALS
HEART RATE: 89 BPM | RESPIRATION RATE: 18 BRPM | DIASTOLIC BLOOD PRESSURE: 83 MMHG | TEMPERATURE: 98.1 F | WEIGHT: 114.4 LBS | OXYGEN SATURATION: 98 % | SYSTOLIC BLOOD PRESSURE: 118 MMHG | BODY MASS INDEX: 21.05 KG/M2 | HEIGHT: 62 IN

## 2023-10-23 DIAGNOSIS — J01.10 ACUTE NON-RECURRENT FRONTAL SINUSITIS: Primary | ICD-10-CM

## 2023-10-23 PROCEDURE — 99213 OFFICE O/P EST LOW 20 MIN: CPT | Performed by: INTERNAL MEDICINE

## 2023-10-23 RX ORDER — FLUTICASONE PROPIONATE 50 MCG
1 SPRAY, SUSPENSION (ML) NASAL DAILY
COMMUNITY

## 2023-10-23 RX ORDER — PREDNISONE 20 MG/1
20 TABLET ORAL DAILY
COMMUNITY

## 2023-10-23 RX ORDER — AMOXICILLIN AND CLAVULANATE POTASSIUM 875; 125 MG/1; MG/1
1 TABLET, FILM COATED ORAL 2 TIMES DAILY
Qty: 20 TABLET | Refills: 0 | Status: SHIPPED | OUTPATIENT
Start: 2023-10-23 | End: 2023-11-02

## 2023-10-23 SDOH — ECONOMIC STABILITY: HOUSING INSECURITY
IN THE LAST 12 MONTHS, WAS THERE A TIME WHEN YOU DID NOT HAVE A STEADY PLACE TO SLEEP OR SLEPT IN A SHELTER (INCLUDING NOW)?: NO

## 2023-10-23 SDOH — ECONOMIC STABILITY: INCOME INSECURITY: HOW HARD IS IT FOR YOU TO PAY FOR THE VERY BASICS LIKE FOOD, HOUSING, MEDICAL CARE, AND HEATING?: NOT HARD AT ALL

## 2023-10-23 SDOH — ECONOMIC STABILITY: FOOD INSECURITY: WITHIN THE PAST 12 MONTHS, YOU WORRIED THAT YOUR FOOD WOULD RUN OUT BEFORE YOU GOT MONEY TO BUY MORE.: NEVER TRUE

## 2023-10-23 SDOH — ECONOMIC STABILITY: FOOD INSECURITY: WITHIN THE PAST 12 MONTHS, THE FOOD YOU BOUGHT JUST DIDN'T LAST AND YOU DIDN'T HAVE MONEY TO GET MORE.: NEVER TRUE

## 2023-10-23 ASSESSMENT — ENCOUNTER SYMPTOMS
WHEEZING: 0
COUGH: 0
SINUS PRESSURE: 1
SORE THROAT: 0
SINUS PAIN: 1

## 2023-10-23 NOTE — PROGRESS NOTES
HISTORY OF PRESENT ILLNESS  Macie Pradhan is a 26 y.o. female    HPI  C/o facial pain/pressure, mainly in the forehead, started 3-4 weeks ago, went to  8 days ago, given prednisone and flonase, not better, she also had a low grade fever on/off, no S/t, no cough or wheezing. Review of Systems   Constitutional:  Negative for fever. HENT:  Positive for sinus pressure and sinus pain. Negative for ear pain and sore throat. Respiratory:  Negative for cough and wheezing. Physical Exam  Vitals reviewed. HENT:      Right Ear: Tympanic membrane normal.      Left Ear: Tympanic membrane normal.      Nose:      Right Turbinates: Not swollen. Left Turbinates: Not swollen. Right Sinus: Frontal sinus tenderness present. Left Sinus: Frontal sinus tenderness present. Mouth/Throat:      Pharynx: No oropharyngeal exudate or posterior oropharyngeal erythema. Cardiovascular:      Rate and Rhythm: Normal rate and regular rhythm. Pulmonary:      Effort: Pulmonary effort is normal.      Breath sounds: Normal breath sounds. Lymphadenopathy:      Cervical: No cervical adenopathy. ASSESSMENT and PLAN    1. Acute non-recurrent frontal sinusitis  -     amoxicillin-clavulanate (AUGMENTIN) 875-125 MG per tablet; Take 1 tablet by mouth 2 times daily for 10 days, Disp-20 tablet, R-0Normal  - continue flonase daily         Return if symptoms worsen or fail to improve.

## 2024-01-26 ENCOUNTER — OFFICE VISIT (OUTPATIENT)
Dept: FAMILY MEDICINE CLINIC | Facility: CLINIC | Age: 34
End: 2024-01-26
Payer: OTHER GOVERNMENT

## 2024-01-26 VITALS
TEMPERATURE: 98 F | HEART RATE: 89 BPM | WEIGHT: 117.8 LBS | BODY MASS INDEX: 21.68 KG/M2 | SYSTOLIC BLOOD PRESSURE: 113 MMHG | DIASTOLIC BLOOD PRESSURE: 70 MMHG | RESPIRATION RATE: 18 BRPM | OXYGEN SATURATION: 99 % | HEIGHT: 62 IN

## 2024-01-26 DIAGNOSIS — F42.2 MIXED OBSESSIONAL THOUGHTS AND ACTS: ICD-10-CM

## 2024-01-26 DIAGNOSIS — Z00.00 ROUTINE GENERAL MEDICAL EXAMINATION AT A HEALTH CARE FACILITY: Primary | ICD-10-CM

## 2024-01-26 DIAGNOSIS — E55.9 VITAMIN D DEFICIENCY, UNSPECIFIED: ICD-10-CM

## 2024-01-26 PROCEDURE — 99395 PREV VISIT EST AGE 18-39: CPT | Performed by: INTERNAL MEDICINE

## 2024-01-26 RX ORDER — SERTRALINE HYDROCHLORIDE 100 MG/1
100 TABLET, FILM COATED ORAL DAILY
Qty: 90 TABLET | Refills: 1 | Status: SHIPPED | OUTPATIENT
Start: 2024-01-26

## 2024-01-26 ASSESSMENT — PATIENT HEALTH QUESTIONNAIRE - PHQ9
SUM OF ALL RESPONSES TO PHQ QUESTIONS 1-9: 0
SUM OF ALL RESPONSES TO PHQ QUESTIONS 1-9: 0
2. FEELING DOWN, DEPRESSED OR HOPELESS: 0
SUM OF ALL RESPONSES TO PHQ9 QUESTIONS 1 & 2: 0
SUM OF ALL RESPONSES TO PHQ QUESTIONS 1-9: 0
1. LITTLE INTEREST OR PLEASURE IN DOING THINGS: 0
SUM OF ALL RESPONSES TO PHQ QUESTIONS 1-9: 0

## 2024-01-26 ASSESSMENT — ENCOUNTER SYMPTOMS
WHEEZING: 0
COUGH: 0
NAUSEA: 0
ABDOMINAL PAIN: 0
SHORTNESS OF BREATH: 0
EYE PAIN: 0

## 2024-01-26 NOTE — PROGRESS NOTES
Lucy Sheehan is a 33 y.o. female (: 1990) presenting to address:    Chief Complaint   Patient presents with    Annual Exam       Vitals:    24 0852   BP: 113/70   Pulse: 89   Resp: 18   Temp: 98 °F (36.7 °C)   SpO2: 99%       \"Have you been to the ER, urgent care clinic since your last visit?  Hospitalized since your last visit?\"    NO    “Have you seen or consulted any other health care providers outside of [facility] since your last visit?”    NO         \

## 2024-01-26 NOTE — PROGRESS NOTES
HISTORY OF PRESENT ILLNESS  Lucy Sheehan is a 33 y.o. female    HPI  In for CPE, doing well  OCD, controlled well on zoloft, she feels great, no obsessions, no anxiety or depression  Vit D def, ? Control, she is not taking any vit d supplement    Review of Systems   Constitutional:  Negative for fatigue and fever.   HENT:  Negative for congestion.    Eyes:  Negative for pain.   Respiratory:  Negative for cough, shortness of breath and wheezing.    Cardiovascular:  Negative for chest pain, palpitations and leg swelling.   Gastrointestinal:  Negative for abdominal pain and nausea.   Genitourinary:  Negative for dysuria.   Musculoskeletal:  Negative for arthralgias and myalgias.   Skin:  Negative for rash.   Neurological:  Negative for dizziness, weakness, numbness and headaches.   Psychiatric/Behavioral:  Negative for behavioral problems, dysphoric mood and sleep disturbance. The patient is not nervous/anxious.          Physical Exam  Vitals reviewed.   Constitutional:       Appearance: Normal appearance.   HENT:      Head: Normocephalic and atraumatic.      Right Ear: Tympanic membrane, ear canal and external ear normal.      Left Ear: Tympanic membrane, ear canal and external ear normal.      Nose: Nose normal.      Mouth/Throat:      Pharynx: No oropharyngeal exudate or posterior oropharyngeal erythema.   Eyes:      Extraocular Movements: Extraocular movements intact.      Conjunctiva/sclera: Conjunctivae normal.      Pupils: Pupils are equal, round, and reactive to light.   Neck:      Vascular: No carotid bruit.   Cardiovascular:      Rate and Rhythm: Normal rate and regular rhythm.      Pulses: Normal pulses.      Heart sounds: Normal heart sounds. No murmur heard.  Pulmonary:      Effort: Pulmonary effort is normal.      Breath sounds: Normal breath sounds. No wheezing or rales.   Chest:      Chest wall: No tenderness.   Abdominal:      General: Bowel sounds are normal.      Palpations: Abdomen is soft. There

## 2024-02-02 ENCOUNTER — HOSPITAL ENCOUNTER (OUTPATIENT)
Facility: HOSPITAL | Age: 34
Setting detail: SPECIMEN
End: 2024-02-02
Payer: OTHER GOVERNMENT

## 2024-02-02 DIAGNOSIS — Z00.00 ROUTINE GENERAL MEDICAL EXAMINATION AT A HEALTH CARE FACILITY: ICD-10-CM

## 2024-02-02 DIAGNOSIS — E55.9 VITAMIN D DEFICIENCY, UNSPECIFIED: ICD-10-CM

## 2024-02-02 LAB
25(OH)D3 SERPL-MCNC: 27.3 NG/ML (ref 30–100)
ALBUMIN SERPL-MCNC: 3.5 G/DL (ref 3.4–5)
ALBUMIN/GLOB SERPL: 1 (ref 0.8–1.7)
ALP SERPL-CCNC: 51 U/L (ref 45–117)
ALT SERPL-CCNC: 18 U/L (ref 13–56)
ANION GAP SERPL CALC-SCNC: 1 MMOL/L (ref 3–18)
AST SERPL-CCNC: 12 U/L (ref 10–38)
BASOPHILS # BLD: 0 K/UL (ref 0–0.1)
BASOPHILS NFR BLD: 0 % (ref 0–2)
BILIRUB SERPL-MCNC: 0.5 MG/DL (ref 0.2–1)
BUN SERPL-MCNC: 11 MG/DL (ref 7–18)
BUN/CREAT SERPL: 16 (ref 12–20)
CALCIUM SERPL-MCNC: 8.7 MG/DL (ref 8.5–10.1)
CHLORIDE SERPL-SCNC: 106 MMOL/L (ref 100–111)
CHOLEST SERPL-MCNC: 198 MG/DL
CO2 SERPL-SCNC: 29 MMOL/L (ref 21–32)
CREAT SERPL-MCNC: 0.68 MG/DL (ref 0.6–1.3)
DIFFERENTIAL METHOD BLD: ABNORMAL
EOSINOPHIL # BLD: 0 K/UL (ref 0–0.4)
EOSINOPHIL NFR BLD: 1 % (ref 0–5)
ERYTHROCYTE [DISTWIDTH] IN BLOOD BY AUTOMATED COUNT: 11.1 % (ref 11.6–14.5)
GLOBULIN SER CALC-MCNC: 3.6 G/DL (ref 2–4)
GLUCOSE SERPL-MCNC: 89 MG/DL (ref 74–99)
HCT VFR BLD AUTO: 37.3 % (ref 35–45)
HDLC SERPL-MCNC: 48 MG/DL (ref 40–60)
HDLC SERPL: 4.1 (ref 0–5)
HGB BLD-MCNC: 12.7 G/DL (ref 12–16)
IMM GRANULOCYTES # BLD AUTO: 0 K/UL (ref 0–0.04)
IMM GRANULOCYTES NFR BLD AUTO: 0 % (ref 0–0.5)
LDLC SERPL CALC-MCNC: 119.6 MG/DL (ref 0–100)
LIPID PANEL: ABNORMAL
LYMPHOCYTES # BLD: 2.4 K/UL (ref 0.9–3.6)
LYMPHOCYTES NFR BLD: 27 % (ref 21–52)
MCH RBC QN AUTO: 31.3 PG (ref 24–34)
MCHC RBC AUTO-ENTMCNC: 34 G/DL (ref 31–37)
MCV RBC AUTO: 91.9 FL (ref 78–100)
MONOCYTES # BLD: 0.5 K/UL (ref 0.05–1.2)
MONOCYTES NFR BLD: 5 % (ref 3–10)
NEUTS SEG # BLD: 5.8 K/UL (ref 1.8–8)
NEUTS SEG NFR BLD: 67 % (ref 40–73)
NRBC # BLD: 0 K/UL (ref 0–0.01)
NRBC BLD-RTO: 0 PER 100 WBC
PLATELET # BLD AUTO: 323 K/UL (ref 135–420)
PMV BLD AUTO: 10 FL (ref 9.2–11.8)
POTASSIUM SERPL-SCNC: 3.9 MMOL/L (ref 3.5–5.5)
PROT SERPL-MCNC: 7.1 G/DL (ref 6.4–8.2)
RBC # BLD AUTO: 4.06 M/UL (ref 4.2–5.3)
SODIUM SERPL-SCNC: 136 MMOL/L (ref 136–145)
TRIGL SERPL-MCNC: 152 MG/DL
VLDLC SERPL CALC-MCNC: 30.4 MG/DL
WBC # BLD AUTO: 8.7 K/UL (ref 4.6–13.2)

## 2024-02-02 PROCEDURE — 80053 COMPREHEN METABOLIC PANEL: CPT

## 2024-02-02 PROCEDURE — 36415 COLL VENOUS BLD VENIPUNCTURE: CPT

## 2024-02-02 PROCEDURE — 80061 LIPID PANEL: CPT

## 2024-02-02 PROCEDURE — 82306 VITAMIN D 25 HYDROXY: CPT

## 2024-02-02 PROCEDURE — 85025 COMPLETE CBC W/AUTO DIFF WBC: CPT

## 2024-04-19 ENCOUNTER — OFFICE VISIT (OUTPATIENT)
Dept: FAMILY MEDICINE CLINIC | Facility: CLINIC | Age: 34
End: 2024-04-19
Payer: OTHER GOVERNMENT

## 2024-04-19 VITALS
WEIGHT: 116.8 LBS | TEMPERATURE: 98 F | DIASTOLIC BLOOD PRESSURE: 88 MMHG | HEART RATE: 78 BPM | OXYGEN SATURATION: 99 % | SYSTOLIC BLOOD PRESSURE: 124 MMHG | RESPIRATION RATE: 18 BRPM | HEIGHT: 62 IN | BODY MASS INDEX: 21.49 KG/M2

## 2024-04-19 DIAGNOSIS — J02.9 ACUTE PHARYNGITIS, UNSPECIFIED ETIOLOGY: Primary | ICD-10-CM

## 2024-04-19 LAB
GROUP A STREP ANTIGEN, POC: NEGATIVE
VALID INTERNAL CONTROL, POC: YES

## 2024-04-19 PROCEDURE — 87880 STREP A ASSAY W/OPTIC: CPT | Performed by: INTERNAL MEDICINE

## 2024-04-19 PROCEDURE — 99213 OFFICE O/P EST LOW 20 MIN: CPT | Performed by: INTERNAL MEDICINE

## 2024-04-19 RX ORDER — LORATADINE 10 MG/1
10 TABLET ORAL DAILY
COMMUNITY

## 2024-04-19 ASSESSMENT — ENCOUNTER SYMPTOMS
COUGH: 0
SINUS PRESSURE: 0

## 2024-04-19 NOTE — PROGRESS NOTES
Lucy Sheehan is a 33 y.o. female (: 1990) presenting to address:    Chief Complaint   Patient presents with    Pharyngitis       Vitals:    24 1343   BP: 124/88   Pulse: 78   Resp: 18   Temp: 98 °F (36.7 °C)   SpO2: 99%       \"Have you been to the ER, urgent care clinic since your last visit?  Hospitalized since your last visit?\"    NO    “Have you seen or consulted any other health care providers outside of [facility] since your last visit?”    NO

## 2024-04-19 NOTE — PROGRESS NOTES
HISTORY OF PRESENT ILLNESS  Lucy Sheehan is a 33 y.o. female    HPI    C/o woke up this morning with sore throat, no cough, no fever or chills, no wheezing, no swollen glands.  Her daughter was diagnosed with strep last week.  Review of Systems   Constitutional:  Negative for chills and fever.   HENT:  Negative for ear pain and sinus pressure.    Respiratory:  Negative for cough.    Cardiovascular:  Negative for chest pain.         Physical Exam  Vitals reviewed.   HENT:      Right Ear: Tympanic membrane normal.      Left Ear: Tympanic membrane normal.      Mouth/Throat:      Pharynx: Posterior oropharyngeal erythema present. No oropharyngeal exudate.   Cardiovascular:      Rate and Rhythm: Normal rate and regular rhythm.   Pulmonary:      Effort: Pulmonary effort is normal.      Breath sounds: Normal breath sounds.   Lymphadenopathy:      Cervical: No cervical adenopathy.   Neurological:      Mental Status: She is oriented to person, place, and time.          ASSESSMENT and PLAN    1. Acute pharyngitis, unspecified etiology, most likely viral, strep test is negative today  -     AMB POC RAPID STREP A  -Ibuprofen over-the-counter as needed for sore throat.     Results for orders placed or performed in visit on 04/19/24   AMB POC RAPID STREP A   Result Value Ref Range    Valid Internal Control, POC YES     Group A Strep Antigen, POC Negative          Return if symptoms worsen or fail to improve.

## 2024-07-11 ENCOUNTER — OFFICE VISIT (OUTPATIENT)
Dept: FAMILY MEDICINE CLINIC | Facility: CLINIC | Age: 34
End: 2024-07-11
Payer: OTHER GOVERNMENT

## 2024-07-11 VITALS
OXYGEN SATURATION: 100 % | HEIGHT: 62 IN | SYSTOLIC BLOOD PRESSURE: 111 MMHG | BODY MASS INDEX: 21.57 KG/M2 | WEIGHT: 117.2 LBS | DIASTOLIC BLOOD PRESSURE: 74 MMHG | RESPIRATION RATE: 18 BRPM | HEART RATE: 84 BPM | TEMPERATURE: 98 F

## 2024-07-11 DIAGNOSIS — R10.13 EPIGASTRIC PAIN: Primary | ICD-10-CM

## 2024-07-11 PROCEDURE — 99213 OFFICE O/P EST LOW 20 MIN: CPT | Performed by: INTERNAL MEDICINE

## 2024-07-11 RX ORDER — PANTOPRAZOLE SODIUM 40 MG/1
40 TABLET, DELAYED RELEASE ORAL
Qty: 90 TABLET | Refills: 0 | Status: SHIPPED | OUTPATIENT
Start: 2024-07-11

## 2024-07-11 ASSESSMENT — ENCOUNTER SYMPTOMS
SHORTNESS OF BREATH: 0
ABDOMINAL DISTENTION: 0
NAUSEA: 0
VOMITING: 0

## 2024-07-11 NOTE — PROGRESS NOTES
HISTORY OF PRESENT ILLNESS  Lucy Sheehan is a 33 y.o. female    HPI  Patient is in today complaining of upper mid abdominal pain/discomfort, sometimes radiate up into the substernal area, she does have history of reflux disease, she denies any nausea or vomiting, no melena, no weight loss, no fever or chills, the problem started 2 weeks ago.    Review of Systems   Constitutional:  Negative for unexpected weight change.   Respiratory:  Negative for shortness of breath.    Cardiovascular:  Negative for chest pain.   Gastrointestinal:  Negative for abdominal distention, nausea and vomiting.         Physical Exam  Vitals reviewed.   Cardiovascular:      Rate and Rhythm: Normal rate and regular rhythm.   Pulmonary:      Effort: Pulmonary effort is normal.      Breath sounds: Normal breath sounds.   Chest:      Chest wall: No tenderness.   Abdominal:      Palpations: Abdomen is soft.      Tenderness: There is abdominal tenderness in the epigastric area. There is no guarding or rebound.          ASSESSMENT and PLAN    1. Epigastric pain, most likely 2/2 GERD  -     pantoprazole (PROTONIX) 40 MG tablet; Take 1 tablet by mouth every morning (before breakfast), Disp-90 tablet, R-0Normal         Return in about 15 days (around 7/26/2024).

## 2024-07-11 NOTE — PROGRESS NOTES
Lucy Sheehan is a 33 y.o. female (: 1990) presenting to address:    Chief Complaint   Patient presents with    Medication Check       Vitals:    24 0832   BP: 111/74   Pulse: 84   Resp: 18   Temp: 98 °F (36.7 °C)   SpO2: 100%       \"Have you been to the ER, urgent care clinic since your last visit?  Hospitalized since your last visit?\"    NO    “Have you seen or consulted any other health care providers outside of [facility] since your last visit?”    NO

## 2024-07-26 ENCOUNTER — OFFICE VISIT (OUTPATIENT)
Dept: FAMILY MEDICINE CLINIC | Facility: CLINIC | Age: 34
End: 2024-07-26
Payer: OTHER GOVERNMENT

## 2024-07-26 VITALS
OXYGEN SATURATION: 97 % | RESPIRATION RATE: 16 BRPM | SYSTOLIC BLOOD PRESSURE: 100 MMHG | HEIGHT: 62 IN | BODY MASS INDEX: 21.53 KG/M2 | DIASTOLIC BLOOD PRESSURE: 70 MMHG | HEART RATE: 94 BPM | TEMPERATURE: 97.7 F | WEIGHT: 117 LBS

## 2024-07-26 DIAGNOSIS — F42.2 MIXED OBSESSIONAL THOUGHTS AND ACTS: Primary | ICD-10-CM

## 2024-07-26 DIAGNOSIS — R10.13 EPIGASTRIC PAIN: ICD-10-CM

## 2024-07-26 PROCEDURE — 99214 OFFICE O/P EST MOD 30 MIN: CPT | Performed by: INTERNAL MEDICINE

## 2024-07-26 RX ORDER — SERTRALINE HYDROCHLORIDE 100 MG/1
100 TABLET, FILM COATED ORAL DAILY
Qty: 90 TABLET | Refills: 1 | Status: SHIPPED | OUTPATIENT
Start: 2024-07-26

## 2024-07-26 ASSESSMENT — ENCOUNTER SYMPTOMS
NAUSEA: 0
ABDOMINAL PAIN: 0

## 2024-07-26 NOTE — PROGRESS NOTES
Lucy Sheehan is a 34 y.o. female (: 1990) presenting to address:    Chief Complaint   Patient presents with    Medication Check       Vitals:    24 0856   BP: 100/70   Pulse: 94   Resp: 16   Temp: 97.7 °F (36.5 °C)   SpO2: 97%       \"Have you been to the ER, urgent care clinic since your last visit?  Hospitalized since your last visit?\"    NO    “Have you seen or consulted any other health care providers outside of [facility] since your last visit?”    NO

## 2024-07-26 NOTE — PROGRESS NOTES
HISTORY OF PRESENT ILLNESS  Lucy Sheehan is a 34 y.o. female    HPI    OCD, stable, she is doing well, on Zoloft 100 mg daily.  Epigastric pain, improved, she stated that she is feeling much better on the Protonix daily, no abdominal pain, no nausea/vomiting/melena.  Review of Systems   Cardiovascular:  Negative for chest pain.   Gastrointestinal:  Negative for abdominal pain and nausea.   Psychiatric/Behavioral:  Negative for behavioral problems and dysphoric mood. The patient is not nervous/anxious.          Physical Exam  Vitals reviewed.   Constitutional:       Appearance: Normal appearance.   Cardiovascular:      Rate and Rhythm: Normal rate and regular rhythm.   Pulmonary:      Effort: Pulmonary effort is normal.      Breath sounds: Normal breath sounds.   Abdominal:      General: Bowel sounds are normal.      Palpations: Abdomen is soft.      Tenderness: There is no abdominal tenderness.   Neurological:      Mental Status: She is oriented to person, place, and time.   Psychiatric:         Attention and Perception: Attention normal.         Mood and Affect: Mood and affect normal. Mood is not anxious or depressed.         Speech: Speech normal.         Behavior: Behavior normal.         Thought Content: Thought content normal.          ASSESSMENT and PLAN    1. Mixed obsessional thoughts and acts, controlled, continue Zoloft 100 mg daily  -     sertraline (ZOLOFT) 100 MG tablet; Take 1 tablet by mouth daily, Disp-90 tablet, R-1Normal  2. Epigastric pain, improved, patient was advised to continue Protonix 40 mg daily for total of 30 days then she may hold it and resume if symptoms recur.         Return in about 6 months (around 1/27/2025) for Physical next visit.

## 2024-07-26 NOTE — PATIENT INSTRUCTIONS
Please continue taking Protonix for total 30 days, then you may stop, then you may resume if your abdominal pain recur.

## 2024-08-28 ENCOUNTER — OFFICE VISIT (OUTPATIENT)
Dept: FAMILY MEDICINE CLINIC | Facility: CLINIC | Age: 34
End: 2024-08-28
Payer: OTHER GOVERNMENT

## 2024-08-28 VITALS
SYSTOLIC BLOOD PRESSURE: 108 MMHG | HEART RATE: 79 BPM | OXYGEN SATURATION: 99 % | BODY MASS INDEX: 21.53 KG/M2 | TEMPERATURE: 98 F | HEIGHT: 62 IN | WEIGHT: 117 LBS | RESPIRATION RATE: 18 BRPM | DIASTOLIC BLOOD PRESSURE: 74 MMHG

## 2024-08-28 DIAGNOSIS — F42.2 MIXED OBSESSIONAL THOUGHTS AND ACTS: Primary | ICD-10-CM

## 2024-08-28 PROCEDURE — 99213 OFFICE O/P EST LOW 20 MIN: CPT | Performed by: INTERNAL MEDICINE

## 2024-08-28 ASSESSMENT — PATIENT HEALTH QUESTIONNAIRE - PHQ9
SUM OF ALL RESPONSES TO PHQ QUESTIONS 1-9: 0
2. FEELING DOWN, DEPRESSED OR HOPELESS: NOT AT ALL
1. LITTLE INTEREST OR PLEASURE IN DOING THINGS: NOT AT ALL
SUM OF ALL RESPONSES TO PHQ QUESTIONS 1-9: 0
SUM OF ALL RESPONSES TO PHQ9 QUESTIONS 1 & 2: 0
SUM OF ALL RESPONSES TO PHQ QUESTIONS 1-9: 0
SUM OF ALL RESPONSES TO PHQ QUESTIONS 1-9: 0

## 2024-08-28 ASSESSMENT — ENCOUNTER SYMPTOMS: SHORTNESS OF BREATH: 0

## 2024-08-28 NOTE — PROGRESS NOTES
HISTORY OF PRESENT ILLNESS  Lucy Sheehan is a 34 y.o. female    HPI  OCD, well-controlled, she is taking Zoloft 100 mg daily and she has been well-controlled on it, no anxiety, no depression, no insomnia, no obsessional thoughts or acts, she does have refills for now, her  is in the  and he would be stationed in [place] in a few months.      Review of Systems   Respiratory:  Negative for shortness of breath.    Cardiovascular:  Negative for chest pain and palpitations.   Psychiatric/Behavioral:  Negative for agitation, behavioral problems, dysphoric mood, hallucinations and sleep disturbance. The patient is not nervous/anxious.          Physical Exam  Vitals reviewed.   Psychiatric:         Attention and Perception: Attention normal.         Mood and Affect: Mood and affect normal.         Speech: Speech normal.         Behavior: Behavior normal.         Thought Content: Thought content normal.          ASSESSMENT and PLAN    1. Mixed obsessional thoughts and acts, well-controlled and stable on Zoloft 100 mg daily, will continue Zoloft 100 mg daily.  Her health assessment form was completed today for her also.  She is advised to get her COVID booster and the flu vaccine at the pharmacy soon.         No follow-ups on file.

## 2024-08-28 NOTE — PROGRESS NOTES
Lucy Sheehan is a 34 y.o. female (: 1990) presenting to address:    Chief Complaint   Patient presents with    Medication Check       Vitals:    24 0927   BP: 108/74   Pulse: 79   Resp: 18   Temp: 98 °F (36.7 °C)   SpO2: 99%       \"Have you been to the ER, urgent care clinic since your last visit?  Hospitalized since your last visit?\"    NO    “Have you seen or consulted any other health care providers outside of [facility] since your last visit?”    NO

## 2025-02-05 DIAGNOSIS — F42.2 MIXED OBSESSIONAL THOUGHTS AND ACTS: ICD-10-CM

## 2025-02-05 RX ORDER — SERTRALINE HYDROCHLORIDE 100 MG/1
100 TABLET, FILM COATED ORAL DAILY
Qty: 30 TABLET | Refills: 0 | Status: SHIPPED | OUTPATIENT
Start: 2025-02-05